# Patient Record
Sex: FEMALE | URBAN - METROPOLITAN AREA
[De-identification: names, ages, dates, MRNs, and addresses within clinical notes are randomized per-mention and may not be internally consistent; named-entity substitution may affect disease eponyms.]

---

## 2020-02-11 ENCOUNTER — TELEPHONE (OUTPATIENT)
Dept: NEUROLOGY | Facility: CLINIC | Age: 70
End: 2020-02-11

## 2021-03-22 ENCOUNTER — BULK ORDERING (OUTPATIENT)
Dept: CASE MANAGEMENT | Facility: OTHER | Age: 71
End: 2021-03-22

## 2021-03-22 DIAGNOSIS — Z23 IMMUNIZATION DUE: ICD-10-CM

## 2023-08-29 ENCOUNTER — TELEPHONE (OUTPATIENT)
Dept: NEUROLOGY | Facility: CLINIC | Age: 73
End: 2023-08-29
Payer: MEDICARE

## 2023-09-29 ENCOUNTER — OFFICE VISIT (OUTPATIENT)
Dept: NEUROLOGY | Facility: CLINIC | Age: 73
End: 2023-09-29
Payer: MEDICARE

## 2023-09-29 VITALS
HEIGHT: 62 IN | SYSTOLIC BLOOD PRESSURE: 110 MMHG | OXYGEN SATURATION: 96 % | WEIGHT: 194.2 LBS | DIASTOLIC BLOOD PRESSURE: 72 MMHG | HEART RATE: 90 BPM | BODY MASS INDEX: 35.74 KG/M2

## 2023-09-29 DIAGNOSIS — R41.89 COGNITIVE CHANGES: ICD-10-CM

## 2023-09-29 DIAGNOSIS — E53.9 VITAMIN B DEFICIENCY: Primary | ICD-10-CM

## 2023-09-29 RX ORDER — CYANOCOBALAMIN 1000 UG/ML
1000 INJECTION, SOLUTION INTRAMUSCULAR; SUBCUTANEOUS
Status: SHIPPED | OUTPATIENT
Start: 2023-09-29

## 2023-09-29 RX ORDER — METOPROLOL SUCCINATE 100 MG/1
200 TABLET, EXTENDED RELEASE ORAL DAILY
COMMUNITY
Start: 2023-09-15

## 2023-09-29 RX ORDER — ROSUVASTATIN CALCIUM 20 MG/1
20 TABLET, COATED ORAL DAILY
Qty: 30 TABLET | Refills: 11 | COMMUNITY
Start: 2023-08-04 | End: 2024-08-03

## 2023-09-29 RX ORDER — OMEPRAZOLE 20 MG/1
20 CAPSULE, DELAYED RELEASE ORAL DAILY
Qty: 30 CAPSULE | Refills: 11 | COMMUNITY
Start: 2023-08-10 | End: 2024-08-09

## 2023-09-29 RX ORDER — VENLAFAXINE 25 MG/1
1 TABLET ORAL 2 TIMES DAILY
COMMUNITY
Start: 2023-09-15

## 2023-09-29 RX ORDER — LOSARTAN POTASSIUM 25 MG/1
1 TABLET ORAL DAILY
COMMUNITY
Start: 2023-09-16

## 2023-09-29 RX ORDER — MELATONIN
1000 DAILY
COMMUNITY

## 2023-09-29 RX ORDER — BIOTIN 10000 MCG
CAPSULE ORAL
COMMUNITY
Start: 2019-03-01

## 2023-09-29 RX ORDER — CETIRIZINE HYDROCHLORIDE 5 MG/1
5 TABLET ORAL DAILY
COMMUNITY

## 2023-09-29 RX ORDER — AMLODIPINE BESYLATE 10 MG/1
10 TABLET ORAL DAILY
COMMUNITY
Start: 2023-09-15

## 2023-09-29 RX ADMIN — CYANOCOBALAMIN 1000 MCG: 1000 INJECTION, SOLUTION INTRAMUSCULAR; SUBCUTANEOUS at 11:16

## 2023-09-29 NOTE — PROGRESS NOTES
Chief complaint: History of short-term memory loss.    Patient ID: Laurie Kenyon is a 73 y.o. female.    HPI: I had the pleasure of seeing your patient today.  As you may know she is a 73-year-old female here for the evaluation of memory change.  Patient states that she has noted memory changes for the past few years.  She dates back to at least 2019.  She says that back then she started to notice some word finding trouble.  This has progressed to include difficulty reading.  She also has trouble with writing.  Spelling is an issue for her as of late.  She has not had any significant issues with the process of food preparation.  No trouble driving or navigating.  She does note that on 1 occasion she paid a bill twice.  She does seem to be good with her processes at work.  He has not left the water running or so on.  Again she navigates well when driving.  She has not gotten lost.  She does feel that overall these symptoms are progressive in nature.  No history of severe head trauma.  She does acknowledge a significant amount of social stressors in her life currently.  No double vision or loss of vision.  No slurred speech.  No focal weakness or numbness of her arms or legs.  Of note, she was noted to have a vitamin B12 level of 390 a year ago.  About a month ago it was 447.  She is not currently replacing her vitamin B12 orally or by injection.  No family history of dementia.    The following portions of the patient's history were reviewed and updated as appropriate: allergies, current medications, past family history, past medical history, past social history, past surgical history and problem list.    Review of Systems   Constitutional:  Positive for fatigue. Negative for unexpected weight change.   HENT:  Negative for ear pain, hearing loss, nosebleeds and tinnitus.    Eyes:  Negative for photophobia, pain and visual disturbance.   Respiratory:  Positive for cough and shortness of breath. Negative for chest  tightness and wheezing.    Cardiovascular:  Negative for chest pain and palpitations.   Musculoskeletal:  Positive for gait problem (balance, 1 fall).   Allergic/Immunologic: Negative for food allergies.   Neurological:  Positive for speech difficulty (trouble getting words out).   Psychiatric/Behavioral:  Negative for confusion, decreased concentration and sleep disturbance.     I have reviewed the review of systems above performed by my medical assistant.      Vitals:    09/29/23 0954   BP: 110/72   Pulse: 90   SpO2: 96%       Neurologic Exam     Mental Status   Oriented to person, place, and time.   Registration: recalls 3 of 3 objects. Follows 3 step commands.   Attention: normal. Concentration: normal.   Speech: speech is normal   Level of consciousness: alert  Knowledge: consistent with education (No deficits found.).   Normal comprehension.     Cranial Nerves     CN II   Visual fields full to confrontation.     CN III, IV, VI   Pupils are equal, round, and reactive to light.  Extraocular motions are normal.   CN III: no CN III palsy  CN VI: no CN VI palsy  Nystagmus: none   Diplopia: none    CN V   Facial sensation intact.     CN VII   Facial expression full, symmetric.     CN VIII   CN VIII normal.     CN IX, X   CN IX normal.   CN X normal.     CN XI   CN XI normal.     CN XII   CN XII normal.     Motor Exam   Muscle bulk: normal  Right arm tone: normal  Left arm tone: normal  Right leg tone: normal  Left leg tone: normal    Strength   Right neck flexion: 5/5  Left neck flexion: 5/5  Right neck extension: 5/5  Left neck extension: 5/5  Right deltoid: 5/5  Left deltoid: 5/5  Right biceps: 5/5  Left biceps: 5/5  Right triceps: 5/5  Left triceps: 5/5  Right wrist flexion: 5/5  Left wrist flexion: 5/5  Right wrist extension: 5/5  Left wrist extension: 5/5  Right interossei: 5/5  Left interossei: 5/5  Right abdominals: 5/5  Left abdominals: 5/5  Right iliopsoas: 5/5  Left iliopsoas: 5/5  Right quadriceps:  5/5  Left quadriceps: 5/5  Right hamstrin/5  Left hamstrin/5  Right glutei: 5/5  Left glutei: 5/5  Right anterior tibial: 5/5  Left anterior tibial: 5/5  Right posterior tibial: 5/5  Left posterior tibial: 5/5  Right peroneal: 5/5  Left peroneal: 5/5  Right gastroc: 5/5  Left gastroc: 5/5    Sensory Exam   Light touch normal.   Vibration normal.   Proprioception normal.   Pinprick normal.     Gait, Coordination, and Reflexes     Gait  Gait: normal    Coordination   Romberg: negative    Tremor   Resting tremor: absent  Intention tremor: absent    Reflexes   Right brachioradialis: 2+  Left brachioradialis: 2+  Right biceps: 2+  Left biceps: 2+  Right triceps: 2+  Left triceps: 2+  Right patellar: 2+  Left patellar: 2+  Right achilles: 2+  Left achilles: 2+  Right : 2+  Left : 2+Station is normal.     Physical Exam  Vitals reviewed.   Constitutional:       General: She is not in acute distress.     Appearance: She is well-developed.   HENT:      Head: Normocephalic and atraumatic.   Eyes:      Extraocular Movements: EOM normal.      Pupils: Pupils are equal, round, and reactive to light.   Cardiovascular:      Rate and Rhythm: Normal rate and regular rhythm.      Heart sounds: Normal heart sounds.   Pulmonary:      Effort: Pulmonary effort is normal. No respiratory distress.      Breath sounds: Normal breath sounds.   Abdominal:      General: Bowel sounds are normal. There is no distension.      Palpations: Abdomen is soft.      Tenderness: There is no abdominal tenderness.   Musculoskeletal:         General: No deformity.      Cervical back: Normal range of motion.   Skin:     General: Skin is warm.      Findings: No rash.   Neurological:      Mental Status: She is oriented to person, place, and time.      Coordination: Romberg Test normal.      Gait: Gait is intact.      Deep Tendon Reflexes:      Reflex Scores:       Tricep reflexes are 2+ on the right side and 2+ on the left side.       Bicep  reflexes are 2+ on the right side and 2+ on the left side.       Brachioradialis reflexes are 2+ on the right side and 2+ on the left side.       Patellar reflexes are 2+ on the right side and 2+ on the left side.       Achilles reflexes are 2+ on the right side and 2+ on the left side.  Psychiatric:         Speech: Speech normal.         Judgment: Judgment normal.       Procedures    Assessment/Plan: We are going to start her on a vitamin B12 injection protocol here at the office.  We are also going to schedule an MRI of the brain both with and without contrast.  We will order neuropsych testing for her.  She will see us back after testing is been completed.       Diagnoses and all orders for this visit:    1. Vitamin B deficiency (Primary)  -     cyanocobalamin injection 1,000 mcg    2. Cognitive changes  -     Ambulatory Referral to Neuropsychology  -     MRI Brain With & Without Contrast; Future           Josesito Joseph II, MD

## 2023-09-29 NOTE — LETTER
September 29, 2023       No Recipients    Patient: Laurie Kenyon   YOB: 1950   Date of Visit: 9/29/2023     Dear Mylene Bermudez MD:       Thank you for referring Laurie Kenyon to me for evaluation. Below are the relevant portions of my assessment and plan of care.    If you have questions, please do not hesitate to call me. I look forward to following Laurie along with you.         Sincerely,        Josesito Joseph II, MD        CC:   No Recipients    Josesito Joseph II, MD  09/29/23 1121  Sign when Signing Visit  Chief complaint: History of short-term memory loss.    Patient ID: Laurie Kenyon is a 73 y.o. female.    HPI: I had the pleasure of seeing your patient today.  As you may know she is a 73-year-old female here for the evaluation of memory change.  Patient states that she has noted memory changes for the past few years.  She dates back to at least 2019.  She says that back then she started to notice some word finding trouble.  This has progressed to include difficulty reading.  She also has trouble with writing.  Spelling is an issue for her as of late.  She has not had any significant issues with the process of food preparation.  No trouble driving or navigating.  She does note that on 1 occasion she paid a bill twice.  She does seem to be good with her processes at work.  He has not left the water running or so on.  Again she navigates well when driving.  She has not gotten lost.  She does feel that overall these symptoms are progressive in nature.  No history of severe head trauma.  She does acknowledge a significant amount of social stressors in her life currently.  No double vision or loss of vision.  No slurred speech.  No focal weakness or numbness of her arms or legs.  Of note, she was noted to have a vitamin B12 level of 390 a year ago.  About a month ago it was 447.  She is not currently replacing her vitamin B12 orally or by injection.  No family history of dementia.    The  following portions of the patient's history were reviewed and updated as appropriate: allergies, current medications, past family history, past medical history, past social history, past surgical history and problem list.    Review of Systems   Constitutional:  Positive for fatigue. Negative for unexpected weight change.   HENT:  Negative for ear pain, hearing loss, nosebleeds and tinnitus.    Eyes:  Negative for photophobia, pain and visual disturbance.   Respiratory:  Positive for cough and shortness of breath. Negative for chest tightness and wheezing.    Cardiovascular:  Negative for chest pain and palpitations.   Musculoskeletal:  Positive for gait problem (balance, 1 fall).   Allergic/Immunologic: Negative for food allergies.   Neurological:  Positive for speech difficulty (trouble getting words out).   Psychiatric/Behavioral:  Negative for confusion, decreased concentration and sleep disturbance.     I have reviewed the review of systems above performed by my medical assistant.      Vitals:    09/29/23 0954   BP: 110/72   Pulse: 90   SpO2: 96%       Neurologic Exam     Mental Status   Oriented to person, place, and time.   Registration: recalls 3 of 3 objects. Follows 3 step commands.   Attention: normal. Concentration: normal.   Speech: speech is normal   Level of consciousness: alert  Knowledge: consistent with education (No deficits found.).   Normal comprehension.     Cranial Nerves     CN II   Visual fields full to confrontation.     CN III, IV, VI   Pupils are equal, round, and reactive to light.  Extraocular motions are normal.   CN III: no CN III palsy  CN VI: no CN VI palsy  Nystagmus: none   Diplopia: none    CN V   Facial sensation intact.     CN VII   Facial expression full, symmetric.     CN VIII   CN VIII normal.     CN IX, X   CN IX normal.   CN X normal.     CN XI   CN XI normal.     CN XII   CN XII normal.     Motor Exam   Muscle bulk: normal  Right arm tone: normal  Left arm tone:  normal  Right leg tone: normal  Left leg tone: normal    Strength   Right neck flexion: 5/5  Left neck flexion: 5/5  Right neck extension: 5/5  Left neck extension: 5/5  Right deltoid: 5/5  Left deltoid: 5/5  Right biceps: 5/5  Left biceps: 5/5  Right triceps: 5/5  Left triceps: 5/5  Right wrist flexion: 5/5  Left wrist flexion: 5/5  Right wrist extension: 5/5  Left wrist extension: 5/5  Right interossei: 5/5  Left interossei: 5/5  Right abdominals: 5/5  Left abdominals: 5/5  Right iliopsoas: 5/5  Left iliopsoas: 5/5  Right quadriceps: 5/5  Left quadriceps: 5/5  Right hamstrin/5  Left hamstrin/5  Right glutei: 5/5  Left glutei: 5/5  Right anterior tibial: 5/5  Left anterior tibial: 5/5  Right posterior tibial: 5/5  Left posterior tibial: 5/5  Right peroneal: 5/5  Left peroneal: 5/5  Right gastroc: 5/5  Left gastroc: 5/5    Sensory Exam   Light touch normal.   Vibration normal.   Proprioception normal.   Pinprick normal.     Gait, Coordination, and Reflexes     Gait  Gait: normal    Coordination   Romberg: negative    Tremor   Resting tremor: absent  Intention tremor: absent    Reflexes   Right brachioradialis: 2+  Left brachioradialis: 2+  Right biceps: 2+  Left biceps: 2+  Right triceps: 2+  Left triceps: 2+  Right patellar: 2+  Left patellar: 2+  Right achilles: 2+  Left achilles: 2+  Right : 2+  Left : 2+Station is normal.     Physical Exam  Vitals reviewed.   Constitutional:       General: She is not in acute distress.     Appearance: She is well-developed.   HENT:      Head: Normocephalic and atraumatic.   Eyes:      Extraocular Movements: EOM normal.      Pupils: Pupils are equal, round, and reactive to light.   Cardiovascular:      Rate and Rhythm: Normal rate and regular rhythm.      Heart sounds: Normal heart sounds.   Pulmonary:      Effort: Pulmonary effort is normal. No respiratory distress.      Breath sounds: Normal breath sounds.   Abdominal:      General: Bowel sounds are normal.  There is no distension.      Palpations: Abdomen is soft.      Tenderness: There is no abdominal tenderness.   Musculoskeletal:         General: No deformity.      Cervical back: Normal range of motion.   Skin:     General: Skin is warm.      Findings: No rash.   Neurological:      Mental Status: She is oriented to person, place, and time.      Coordination: Romberg Test normal.      Gait: Gait is intact.      Deep Tendon Reflexes:      Reflex Scores:       Tricep reflexes are 2+ on the right side and 2+ on the left side.       Bicep reflexes are 2+ on the right side and 2+ on the left side.       Brachioradialis reflexes are 2+ on the right side and 2+ on the left side.       Patellar reflexes are 2+ on the right side and 2+ on the left side.       Achilles reflexes are 2+ on the right side and 2+ on the left side.  Psychiatric:         Speech: Speech normal.         Judgment: Judgment normal.       Procedures    Assessment/Plan: We are going to start her on a vitamin B12 injection protocol here at the office.  We are also going to schedule an MRI of the brain both with and without contrast.  We will order neuropsych testing for her.  She will see us back after testing is been completed.       Diagnoses and all orders for this visit:    1. Vitamin B deficiency (Primary)  -     cyanocobalamin injection 1,000 mcg    2. Cognitive changes  -     Ambulatory Referral to Neuropsychology  -     MRI Brain With & Without Contrast; Future           Josesito Joseph II, MD

## 2023-10-02 ENCOUNTER — PATIENT MESSAGE (OUTPATIENT)
Dept: NEUROLOGY | Facility: CLINIC | Age: 73
End: 2023-10-02
Payer: MEDICARE

## 2023-10-02 DIAGNOSIS — F41.8 SITUATIONAL ANXIETY: Primary | ICD-10-CM

## 2023-10-06 ENCOUNTER — PATIENT ROUNDING (BHMG ONLY) (OUTPATIENT)
Dept: NEUROLOGY | Facility: CLINIC | Age: 73
End: 2023-10-06
Payer: MEDICARE

## 2023-10-06 ENCOUNTER — CLINICAL SUPPORT (OUTPATIENT)
Dept: NEUROLOGY | Facility: CLINIC | Age: 73
End: 2023-10-06
Payer: COMMERCIAL

## 2023-10-06 DIAGNOSIS — E53.9 VITAMIN B DEFICIENCY: Primary | ICD-10-CM

## 2023-10-06 PROCEDURE — 96372 THER/PROPH/DIAG INJ SC/IM: CPT | Performed by: PSYCHIATRY & NEUROLOGY

## 2023-10-06 RX ORDER — AMOXICILLIN AND CLAVULANATE POTASSIUM 875; 125 MG/1; MG/1
1 TABLET, FILM COATED ORAL
COMMUNITY
Start: 2023-09-30 | End: 2023-10-10

## 2023-10-06 RX ORDER — CYANOCOBALAMIN 1000 UG/ML
1000 INJECTION, SOLUTION INTRAMUSCULAR; SUBCUTANEOUS
Status: SHIPPED | OUTPATIENT
Start: 2023-10-06

## 2023-10-06 RX ADMIN — CYANOCOBALAMIN 1000 MCG: 1000 INJECTION, SOLUTION INTRAMUSCULAR; SUBCUTANEOUS at 12:03

## 2023-10-13 ENCOUNTER — CLINICAL SUPPORT (OUTPATIENT)
Dept: NEUROLOGY | Facility: CLINIC | Age: 73
End: 2023-10-13
Payer: COMMERCIAL

## 2023-10-13 DIAGNOSIS — E53.9 VITAMIN B DEFICIENCY: Primary | ICD-10-CM

## 2023-10-13 PROCEDURE — 96372 THER/PROPH/DIAG INJ SC/IM: CPT | Performed by: PSYCHIATRY & NEUROLOGY

## 2023-10-13 RX ORDER — CYANOCOBALAMIN 1000 UG/ML
1000 INJECTION, SOLUTION INTRAMUSCULAR; SUBCUTANEOUS
Status: SHIPPED | OUTPATIENT
Start: 2023-10-13

## 2023-10-13 RX ADMIN — CYANOCOBALAMIN 1000 MCG: 1000 INJECTION, SOLUTION INTRAMUSCULAR; SUBCUTANEOUS at 11:37

## 2023-10-20 ENCOUNTER — CLINICAL SUPPORT (OUTPATIENT)
Dept: NEUROLOGY | Facility: CLINIC | Age: 73
End: 2023-10-20
Payer: MEDICARE

## 2023-10-20 DIAGNOSIS — E53.9 VITAMIN B DEFICIENCY: Primary | ICD-10-CM

## 2023-10-20 PROCEDURE — 96372 THER/PROPH/DIAG INJ SC/IM: CPT | Performed by: PSYCHIATRY & NEUROLOGY

## 2023-10-20 RX ORDER — CYANOCOBALAMIN 1000 UG/ML
1000 INJECTION, SOLUTION INTRAMUSCULAR; SUBCUTANEOUS
Status: SHIPPED | OUTPATIENT
Start: 2023-10-20

## 2023-10-20 RX ADMIN — CYANOCOBALAMIN 1000 MCG: 1000 INJECTION, SOLUTION INTRAMUSCULAR; SUBCUTANEOUS at 11:36

## 2023-10-23 RX ORDER — DIAZEPAM 10 MG/1
10 TABLET ORAL
Qty: 1 TABLET | Refills: 0 | Status: CANCELLED | OUTPATIENT
Start: 2023-10-23 | End: 2023-10-23

## 2023-10-24 RX ORDER — DIAZEPAM 10 MG/1
10 TABLET ORAL
Qty: 1 TABLET | Refills: 0 | Status: SHIPPED | OUTPATIENT
Start: 2023-10-24 | End: 2023-10-24

## 2023-11-03 ENCOUNTER — CLINICAL SUPPORT (OUTPATIENT)
Dept: NEUROLOGY | Facility: CLINIC | Age: 73
End: 2023-11-03
Payer: MEDICARE

## 2023-11-03 DIAGNOSIS — E53.9 VITAMIN B DEFICIENCY: Primary | ICD-10-CM

## 2023-11-03 PROCEDURE — 96372 THER/PROPH/DIAG INJ SC/IM: CPT | Performed by: PSYCHIATRY & NEUROLOGY

## 2023-11-03 RX ORDER — CYANOCOBALAMIN 1000 UG/ML
1000 INJECTION, SOLUTION INTRAMUSCULAR; SUBCUTANEOUS
Status: DISCONTINUED | OUTPATIENT
Start: 2023-11-03 | End: 2023-11-17

## 2023-11-03 RX ADMIN — CYANOCOBALAMIN 1000 MCG: 1000 INJECTION, SOLUTION INTRAMUSCULAR; SUBCUTANEOUS at 11:55

## 2023-11-17 ENCOUNTER — CLINICAL SUPPORT (OUTPATIENT)
Dept: NEUROLOGY | Facility: CLINIC | Age: 73
End: 2023-11-17
Payer: MEDICARE

## 2023-11-17 DIAGNOSIS — E53.9 VITAMIN B DEFICIENCY: Primary | ICD-10-CM

## 2023-11-17 PROCEDURE — 96372 THER/PROPH/DIAG INJ SC/IM: CPT | Performed by: PSYCHIATRY & NEUROLOGY

## 2023-11-17 RX ORDER — CYANOCOBALAMIN 1000 UG/ML
1000 INJECTION, SOLUTION INTRAMUSCULAR; SUBCUTANEOUS
Status: SHIPPED | OUTPATIENT
Start: 2023-11-17

## 2023-11-17 RX ADMIN — CYANOCOBALAMIN 1000 MCG: 1000 INJECTION, SOLUTION INTRAMUSCULAR; SUBCUTANEOUS at 11:59

## 2023-11-21 ENCOUNTER — HOSPITAL ENCOUNTER (OUTPATIENT)
Facility: HOSPITAL | Age: 73
Discharge: HOME | End: 2023-11-21
Payer: MEDICARE

## 2023-11-21 DIAGNOSIS — R41.89 COGNITIVE CHANGES: ICD-10-CM

## 2023-12-01 ENCOUNTER — CLINICAL SUPPORT (OUTPATIENT)
Dept: NEUROLOGY | Facility: CLINIC | Age: 73
End: 2023-12-01
Payer: MEDICARE

## 2023-12-01 DIAGNOSIS — E53.8 B12 DEFICIENCY: Primary | ICD-10-CM

## 2023-12-01 PROCEDURE — 96372 THER/PROPH/DIAG INJ SC/IM: CPT | Performed by: PSYCHIATRY & NEUROLOGY

## 2023-12-01 RX ORDER — CYANOCOBALAMIN 1000 UG/ML
1000 INJECTION, SOLUTION INTRAMUSCULAR; SUBCUTANEOUS
Status: SHIPPED | OUTPATIENT
Start: 2023-12-01

## 2023-12-01 RX ADMIN — CYANOCOBALAMIN 1000 MCG: 1000 INJECTION, SOLUTION INTRAMUSCULAR; SUBCUTANEOUS at 15:02

## 2023-12-11 DIAGNOSIS — R41.89 COGNITIVE CHANGES: Primary | ICD-10-CM

## 2023-12-15 ENCOUNTER — CLINICAL SUPPORT (OUTPATIENT)
Dept: NEUROLOGY | Facility: CLINIC | Age: 73
End: 2023-12-15
Payer: MEDICARE

## 2023-12-15 DIAGNOSIS — E53.8 B12 DEFICIENCY: Primary | ICD-10-CM

## 2023-12-15 PROCEDURE — 96372 THER/PROPH/DIAG INJ SC/IM: CPT | Performed by: PSYCHIATRY & NEUROLOGY

## 2023-12-15 RX ORDER — CYANOCOBALAMIN 1000 UG/ML
1000 INJECTION, SOLUTION INTRAMUSCULAR; SUBCUTANEOUS
Status: SHIPPED | OUTPATIENT
Start: 2023-12-15

## 2023-12-15 RX ADMIN — CYANOCOBALAMIN 1000 MCG: 1000 INJECTION, SOLUTION INTRAMUSCULAR; SUBCUTANEOUS at 11:42

## 2023-12-22 ENCOUNTER — TELEPHONE (OUTPATIENT)
Dept: NEUROLOGY | Facility: CLINIC | Age: 73
End: 2023-12-22
Payer: MEDICARE

## 2023-12-22 NOTE — TELEPHONE ENCOUNTER
Caller: Laurie Kenyon    Relationship: Self    Best call back number:   Telephone Information:   Mobile [phone]       What is the best time to reach you: ANYTIME    Who are you requesting to speak with (clinical staff, provider,  specific staff member): CLINICAL    What was the call regarding: ORDER FOR OPEN MRI - NEEDS AN ORDER SENT TO [facility] OPEN MRI ([phone])

## 2023-12-27 NOTE — TELEPHONE ENCOUNTER
Order has been resent. Patient is scheduled 01/25/24 arriving at 10:30 am and scan will be at 11:00 am. This will be completed at [facility]

## 2023-12-27 NOTE — TELEPHONE ENCOUNTER
Caller: Kostas Laurie    Relationship: Self    Best call back number: [phone]    Who are you requesting to speak with (clinical staff, provider,  specific staff member): STAFF    Do you know the name of the person who called: KULWANT NEAL    What was the call regarding: PATIENT CAN'T SCHEDULE WITH [facility] FOR MRI BECAUSE THEY ARE REPORTING THAT THEY HAVE NOT GOTTEN THE ORDER FROM DR. ATWOOD. CAN YOU PLEASE FAX THAT ORDER -[phone]?     Is it okay if the provider responds through MyChart: YES

## 2023-12-29 ENCOUNTER — CLINICAL SUPPORT (OUTPATIENT)
Dept: NEUROLOGY | Facility: CLINIC | Age: 73
End: 2023-12-29
Payer: MEDICARE

## 2023-12-29 DIAGNOSIS — E53.8 B12 DEFICIENCY: Primary | ICD-10-CM

## 2023-12-29 PROCEDURE — 96372 THER/PROPH/DIAG INJ SC/IM: CPT | Performed by: PSYCHIATRY & NEUROLOGY

## 2023-12-29 RX ORDER — CYANOCOBALAMIN 1000 UG/ML
1000 INJECTION, SOLUTION INTRAMUSCULAR; SUBCUTANEOUS
Status: SHIPPED | OUTPATIENT
Start: 2023-12-29

## 2023-12-29 RX ADMIN — CYANOCOBALAMIN 1000 MCG: 1000 INJECTION, SOLUTION INTRAMUSCULAR; SUBCUTANEOUS at 14:28

## 2024-01-12 ENCOUNTER — CLINICAL SUPPORT (OUTPATIENT)
Dept: NEUROLOGY | Facility: CLINIC | Age: 74
End: 2024-01-12
Payer: MEDICARE

## 2024-01-12 DIAGNOSIS — E53.8 B12 DEFICIENCY: Primary | ICD-10-CM

## 2024-01-12 PROCEDURE — 96372 THER/PROPH/DIAG INJ SC/IM: CPT | Performed by: PSYCHIATRY & NEUROLOGY

## 2024-01-12 RX ORDER — CYANOCOBALAMIN 1000 UG/ML
1000 INJECTION, SOLUTION INTRAMUSCULAR; SUBCUTANEOUS
Status: SHIPPED | OUTPATIENT
Start: 2024-01-12

## 2024-01-12 RX ADMIN — CYANOCOBALAMIN 1000 MCG: 1000 INJECTION, SOLUTION INTRAMUSCULAR; SUBCUTANEOUS at 12:34

## 2024-01-26 ENCOUNTER — CLINICAL SUPPORT (OUTPATIENT)
Dept: NEUROLOGY | Facility: CLINIC | Age: 74
End: 2024-01-26
Payer: MEDICARE

## 2024-01-26 DIAGNOSIS — E53.8 B12 DEFICIENCY: Primary | ICD-10-CM

## 2024-01-26 PROCEDURE — 96372 THER/PROPH/DIAG INJ SC/IM: CPT | Performed by: PSYCHIATRY & NEUROLOGY

## 2024-01-26 RX ORDER — CYANOCOBALAMIN 1000 UG/ML
1000 INJECTION, SOLUTION INTRAMUSCULAR; SUBCUTANEOUS
Status: SHIPPED | OUTPATIENT
Start: 2024-01-26

## 2024-01-26 RX ADMIN — CYANOCOBALAMIN 1000 MCG: 1000 INJECTION, SOLUTION INTRAMUSCULAR; SUBCUTANEOUS at 12:26

## 2024-02-09 ENCOUNTER — CLINICAL SUPPORT (OUTPATIENT)
Dept: NEUROLOGY | Facility: CLINIC | Age: 74
End: 2024-02-09
Payer: MEDICARE

## 2024-02-09 DIAGNOSIS — E53.9 VITAMIN B DEFICIENCY: Primary | ICD-10-CM

## 2024-02-09 PROCEDURE — 96372 THER/PROPH/DIAG INJ SC/IM: CPT | Performed by: PSYCHIATRY & NEUROLOGY

## 2024-02-09 RX ORDER — EZETIMIBE 10 MG/1
10 TABLET ORAL DAILY
COMMUNITY
Start: 2024-02-07 | End: 2025-02-06

## 2024-02-09 RX ORDER — CYANOCOBALAMIN 1000 UG/ML
1000 INJECTION, SOLUTION INTRAMUSCULAR; SUBCUTANEOUS
Status: SHIPPED | OUTPATIENT
Start: 2024-02-09

## 2024-02-09 RX ORDER — DIAZEPAM 10 MG/1
TABLET ORAL
COMMUNITY
Start: 2023-10-24

## 2024-02-09 RX ADMIN — CYANOCOBALAMIN 1000 MCG: 1000 INJECTION, SOLUTION INTRAMUSCULAR; SUBCUTANEOUS at 11:41

## 2024-03-15 ENCOUNTER — LAB (OUTPATIENT)
Dept: LAB | Facility: HOSPITAL | Age: 74
End: 2024-03-15
Payer: MEDICARE

## 2024-03-15 DIAGNOSIS — E53.9 VITAMIN B DEFICIENCY: ICD-10-CM

## 2024-03-15 DIAGNOSIS — E53.9 VITAMIN B DEFICIENCY: Primary | ICD-10-CM

## 2024-03-15 LAB — VIT B12 BLD-MCNC: 729 PG/ML (ref 211–946)

## 2024-03-15 PROCEDURE — 82607 VITAMIN B-12: CPT

## 2024-03-15 PROCEDURE — 36415 COLL VENOUS BLD VENIPUNCTURE: CPT

## 2024-04-05 ENCOUNTER — TELEPHONE (OUTPATIENT)
Dept: NEUROLOGY | Facility: CLINIC | Age: 74
End: 2024-04-05
Payer: MEDICARE

## 2024-04-05 NOTE — TELEPHONE ENCOUNTER
----- Message from NICOLASA Hicks sent at 3/19/2024  4:59 PM EDT -----  B12 great at 729. May take OTC B12 supplement 500-1000 mcg daily

## 2024-04-08 ENCOUNTER — OFFICE VISIT (OUTPATIENT)
Dept: NEUROLOGY | Facility: CLINIC | Age: 74
End: 2024-04-08
Payer: MEDICARE

## 2024-04-08 VITALS
HEART RATE: 74 BPM | BODY MASS INDEX: 35.33 KG/M2 | SYSTOLIC BLOOD PRESSURE: 118 MMHG | WEIGHT: 192 LBS | DIASTOLIC BLOOD PRESSURE: 72 MMHG | OXYGEN SATURATION: 93 % | HEIGHT: 62 IN

## 2024-04-08 DIAGNOSIS — R41.89 COGNITIVE CHANGES: ICD-10-CM

## 2024-04-08 DIAGNOSIS — E53.8 B12 DEFICIENCY: Primary | ICD-10-CM

## 2024-04-08 PROCEDURE — 1160F RVW MEDS BY RX/DR IN RCRD: CPT | Performed by: PSYCHIATRY & NEUROLOGY

## 2024-04-08 PROCEDURE — 1159F MED LIST DOCD IN RCRD: CPT | Performed by: PSYCHIATRY & NEUROLOGY

## 2024-04-08 PROCEDURE — 99214 OFFICE O/P EST MOD 30 MIN: CPT | Performed by: PSYCHIATRY & NEUROLOGY

## 2024-04-08 NOTE — PROGRESS NOTES
Chief Complaint   Patient presents with    Cognitive changes     Vitamin B deficiency       Patient ID: Laurie Kenyon is a 73 y.o. female.    HPI: I had the pleasure of seeing your patient again today.  As you may know she is a 73-year-old female here for the management of vitamin B12 deficiency and cognitive changes.  She did have neuropsych testing which showed evidence for reaction to significant emotional stress.  They do not feel that she has a neurodegenerative disorder such as mild cognitive impairment or dementia.  She does acknowledge a history significant for anxiety and stress.  She does not feel that she has gotten worse.  She still is somewhat forgetful.  She has taken upon herself to schedule an appointment with a therapist.  She also acknowledges some issues with sleep which may be contributing to her cognitive issues.  She still does have some trouble with speech.  She has some difficulty getting words out.  However upon the neuropsych testing they did not note significant aphasia or diagnosis of primary progressive aphasia.    The following portions of the patient's history were reviewed and updated as appropriate: allergies, current medications, past family history, past medical history, past social history, past surgical history and problem list.    Review of Systems   Constitutional:  Positive for fatigue.   Neurological:  Positive for speech difficulty and headaches. Negative for dizziness, tremors, seizures, syncope, facial asymmetry, weakness, light-headedness and numbness.   Psychiatric/Behavioral:  Positive for sleep disturbance. Negative for agitation, behavioral problems, confusion, decreased concentration, dysphoric mood, hallucinations, self-injury and suicidal ideas. The patient is not nervous/anxious and is not hyperactive.       I have reviewed the review of systems above performed by my medical assistant.      Vitals:    04/08/24 1130   BP: 118/72   Pulse: 74   SpO2: 93%        Neurologic Exam     Mental Status   Oriented to person, place, and time.   Concentration: normal.   Level of consciousness: alert  Knowledge: consistent with education (No deficits found.).     Cranial Nerves     CN II   Visual fields full to confrontation.     CN III, IV, VI   Pupils are equal, round, and reactive to light.  Extraocular motions are normal.   CN III: no CN III palsy  CN VI: no CN VI palsy    CN V   Facial sensation intact.     CN VII   Facial expression full, symmetric.     CN VIII   CN VIII normal.     CN IX, X   CN IX normal.   CN X normal.     CN XI   CN XI normal.     CN XII   CN XII normal.     Motor Exam     Strength   Right neck flexion: 5/5  Left neck flexion: 5/5  Right neck extension: 5/5  Left neck extension: 5/5  Right deltoid: 5/5  Left deltoid: 5/5  Right biceps: 5/5  Left biceps: 5/5  Right triceps: 5/5  Left triceps: 5/5  Right wrist flexion: 5/5  Left wrist flexion: 5/5  Right wrist extension: 5/5  Left wrist extension: 5/5  Right interossei: 5/5  Left interossei: 5/5  Right abdominals: 5/5  Left abdominals: 5/5  Right iliopsoas: 5/5  Left iliopsoas: 5/5  Right quadriceps: 5/5  Left quadriceps: 5/5  Right hamstrin/5  Left hamstrin/5  Right glutei: 5/5  Left glutei: 5/5  Right anterior tibial: 5/5  Left anterior tibial: 5/5  Right posterior tibial: 5/5  Left posterior tibial: 5/5  Right peroneal: 5/5  Left peroneal: 5/5  Right gastroc: 5/5  Left gastroc: 5/5    Sensory Exam   Light touch normal.   Vibration normal.     Gait, Coordination, and Reflexes     Gait  Gait: normal    Reflexes   Right brachioradialis: 2+  Left brachioradialis: 2+  Right biceps: 2+  Left biceps: 2+  Right triceps: 2+  Left triceps: 2+  Right patellar: 2+  Left patellar: 2+  Right achilles: 2+  Left achilles: 2+  Right : 2+  Left : 2+Station is normal.       Physical Exam  Vitals reviewed.   Constitutional:       Appearance: She is well-developed.   HENT:      Head: Normocephalic and  atraumatic.   Eyes:      Extraocular Movements: EOM normal.      Pupils: Pupils are equal, round, and reactive to light.   Cardiovascular:      Rate and Rhythm: Normal rate and regular rhythm.   Pulmonary:      Breath sounds: Normal breath sounds.   Musculoskeletal:         General: Normal range of motion.   Skin:     General: Skin is warm.   Neurological:      Mental Status: She is oriented to person, place, and time.      Gait: Gait is intact.      Deep Tendon Reflexes:      Reflex Scores:       Tricep reflexes are 2+ on the right side and 2+ on the left side.       Bicep reflexes are 2+ on the right side and 2+ on the left side.       Brachioradialis reflexes are 2+ on the right side and 2+ on the left side.       Patellar reflexes are 2+ on the right side and 2+ on the left side.       Achilles reflexes are 2+ on the right side and 2+ on the left side.        Procedures    Assessment/Plan: I would like to schedule her for speech therapy.  We are also going to check a vitamin B12 level.  She has completed a vitamin B12 injection protocol.  Will see her back in 6 months or sooner if needed to review symptoms.  A total of 30 minutes was spent face-to-face with the patient today.  Of that greater than 50% of this time was spent discussing signs and symptoms of B12 deficiency, cognitive change, patient education, plan of care and prognosis.         Diagnoses and all orders for this visit:    1. B12 deficiency (Primary)  -     Vitamin B12    2. Cognitive changes  -     Ambulatory Referral to Speech Therapy           Josesito Joseph II, MD

## 2024-04-23 ENCOUNTER — LAB (OUTPATIENT)
Dept: LAB | Facility: HOSPITAL | Age: 74
End: 2024-04-23
Payer: MEDICARE

## 2024-04-23 LAB — VIT B12 BLD-MCNC: 767 PG/ML (ref 211–946)

## 2024-04-23 PROCEDURE — 82607 VITAMIN B-12: CPT | Performed by: PSYCHIATRY & NEUROLOGY

## 2024-04-26 ENCOUNTER — TELEPHONE (OUTPATIENT)
Dept: NEUROLOGY | Facility: CLINIC | Age: 74
End: 2024-04-26
Payer: MEDICARE

## 2024-04-26 NOTE — TELEPHONE ENCOUNTER
----- Message from Alana ACE sent at 4/25/2024  1:21 PM EDT -----  B12 767, normal. May take OTC B12 supplement 500-1000 mcg daily

## 2024-05-31 ENCOUNTER — HOSPITAL ENCOUNTER (OUTPATIENT)
Dept: SPEECH THERAPY | Facility: HOSPITAL | Age: 74
Setting detail: THERAPIES SERIES
Discharge: HOME | End: 2024-05-31
Payer: MEDICARE

## 2024-05-31 DIAGNOSIS — R41.89 COGNITIVE CHANGES: Primary | ICD-10-CM

## 2024-05-31 PROCEDURE — 96125 COGNITIVE TEST BY HC PRO: CPT | Performed by: SPEECH-LANGUAGE PATHOLOGIST

## 2024-05-31 NOTE — THERAPY EVALUATION
"Outpatient Speech Language Pathology   Adult Speech Language Cognitive Initial Evaluation  [facility]     Patient Name: Laurie Kenyon  : 1950  MRN: [id number]  Today's Date: 2024        Visit Date: 2024   There is no problem list on file for this patient.       Past Medical History:   Diagnosis Date    GERD (gastroesophageal reflux disease) 2019    Hyperlipidemia     Hypertension         Past Surgical History:   Procedure Laterality Date    BREAST RECONSTRUCTION Bilateral     CHOLECYSTECTOMY  2001    MASTECTOMY Bilateral     MYOMECTOMY Bilateral 1998    TONSILLECTOMY           Visit Dx:    ICD-10-CM ICD-9-CM   1. Cognitive changes  R41.89 799.59            OP SLP Assessment/Plan - 24 1559          SLP Assessment    Functional Problems Speech Language- Adult/Cognition  -KA    Clinical Impression: Speech Language-Adult/Congnition Speech Language WFL;Cognitive Communication WFL  -KA    Please refer to paper survey for additional self-reported information Yes  -KA    Please refer to items scanned into chart for additional diagnostic informaiton and handouts as provided by clinician Yes  -KA              User Key  (r) = Recorded By, (t) = Taken By, (c) = Cosigned By      Initials Name Provider Type    Dmitriy Simons, SLP Speech and Language Pathologist                     SLP SLC Evaluation - 24 0900          Communication Assessment/Intervention    Document Type evaluation  -KA    Subjective Information no complaints  -KA    Patient Observations alert;cooperative  -KA    Patient Effort good  -KA    Symptoms Noted During/After Treatment none  -KA       General Information    Patient Profile Reviewed yes  -KA    Pertinent History Of Current Problem Patient referred for cognitive changes. Per MD note \"She did have neuropsych testing which showed evidence for reaction to significant emotional stress.  They do not feel that she has a neurodegenerative disorder such as mild cognitive " "impairment or dementia.  She does acknowledge a history significant for anxiety and stress.  She does not feel that she has gotten worse.  She still is somewhat forgetful.  She has taken upon herself to schedule an appointment with a therapist.  She also acknowledges some issues with sleep which may be contributing to her cognitive issues.  She still does have some trouble with speech.  She has some difficulty getting words out.  However upon the neuropsych testing they did not note significant aphasia or diagnosis of primary progressive aphasia.\" Patient reports today no cognitive changes, denies memory decline. Patient c/o speech issues, \"broken speech.\" Patient denies word finding difficulty and denies slurred speech. Patient reports speech improves without stress and with improved sleep. Patient reports to being under alot of stress due to her daughters health conditions and raising a daughter later in life without her .  -KA    Precautions/Limitations, Vision WFL with corrective lenses  -KA    Precautions/Limitations, Hearing WFL  -    Prior Level of Function-Communication WFL  -    Plans/Goals Discussed with patient  -    Barriers to Rehab none identified  -    Patient's Goals for Discharge functional communication  -    Standardized Assessment Used CLQT  -       Standardized Tests    Cognitive/Memory Tests CLQT: Cognitive Linguistic Quick Test  -       CLQT (The Cognitive Linguistic Quick Test)    Attention Domain Score 200  -KA    Attention Severity Rating 4: WNL  -KA    Memory Domain Score 165  -KA    Memory Severity Rating 4: WNL  -KA    Executive Function Domain Score 25  -KA    Executive Function Severity Rating 4: WNL  -KA    Language Domain Score 32  -KA    Language Severity Rating 4: WN  -KA    Visuospatial Domain Score 90  -KA    Visuospatial Severity Rating 4: [facility]  -    Clock Drawing Total Score 13  -KA    Clock Drawing Severity Rating WNL  -    Composite Severity Rating " "4.0  -KA    Composite Severity Rating Range 4.0 - 3.5: WNL  -KA    CLQT Comments Overall pt demonstrates functional communication and cognitive skills. Only subtest pt scored below the criterion cut off score was in design generation. Patient demonstrates functional memory for detailed paragraph recall and functional thought organization in generating naming task. At the conversation level pt effectively communicated her background and medical information with no word finding impairment and no barriers communicating her thoughts and ideas. Pt demonstrated no evidence of slurred speech.  Pt very occasionally hesitated when formulating longer word with increased syllables (\"appreciation\") however was able to formulate the word with no cues. Overall pt demonstrated functional communication skills. Patient acknowledges she is hard on herself and her symptoms are improved when able to manage stress and improve sleep.  -KA       SLP Evaluation Clinical Impressions    SLP Diagnosis functional speech/language skills;functional cognitive-linguistic skills  -KA    [facility] Criteria for Skilled Therapy Interventions Met no problems identified which require skilled intervention  -KA    Functional Impact no impact on function  -KA              User Key  (r) = Recorded By, (t) = Taken By, (c) = Cosigned By      Initials Name Provider Type    Dmitriy Simons, SLP Speech and Language Pathologist                                    OP SLP Education       Row Name 05/31/24 5419       Education    Barriers to Learning No barriers identified  -KA    Education Provided Described results of evaluation;Patient expressed understanding of evaluation  -KA    Assessed Learning needs;Learning motivation  -KA    Learning Motivation Strong  -SUSAN    Learning Method Explanation  -SUSAN    Teaching Response Verbalized understanding  -SUSAN              User Key  (r) = Recorded By, (t) = Taken By, (c) = Cosigned By      Initials Name Effective Dates    KA " Dmitriy Sorenson SLP 24 -                              Time Calculation:   SLP Start Time: 1300  SLP Stop Time: 1355  SLP Time Calculation (min): 55 min  Timed Charges  96125-Standardized cognitive performance testin  Total Minutes  Timed Charges Total Minutes: 60   Total Minutes: 60    Therapy Charges for Today       Code Description Service Date Service Provider Modifiers Qty    99678465864 HC ST STD COG PERF TEST PER HOUR 2024 Dmitriy Sorenson, GILBERT GN 1                     GILBERT Cardenas  2024

## 2024-10-10 ENCOUNTER — OFFICE VISIT (OUTPATIENT)
Dept: NEUROLOGY | Facility: CLINIC | Age: 74
End: 2024-10-10
Payer: MEDICARE

## 2024-10-10 VITALS
WEIGHT: 179 LBS | DIASTOLIC BLOOD PRESSURE: 68 MMHG | HEIGHT: 62 IN | OXYGEN SATURATION: 95 % | BODY MASS INDEX: 32.94 KG/M2 | RESPIRATION RATE: 20 BRPM | SYSTOLIC BLOOD PRESSURE: 124 MMHG | HEART RATE: 72 BPM

## 2024-10-10 DIAGNOSIS — E53.8 B12 DEFICIENCY: ICD-10-CM

## 2024-10-10 DIAGNOSIS — J38.3 VOCAL CORD DYSFUNCTION: Primary | ICD-10-CM

## 2024-10-10 PROBLEM — C50.919 MALIGNANT TUMOR OF BREAST: Status: ACTIVE | Noted: 2022-10-11

## 2024-10-10 PROBLEM — Z90.13 HISTORY OF BILATERAL MASTECTOMY: Status: ACTIVE | Noted: 2023-08-10

## 2024-10-10 PROBLEM — H25.13 AGE-RELATED NUCLEAR CATARACT OF BOTH EYES: Status: ACTIVE | Noted: 2021-09-19

## 2024-10-10 PROBLEM — E78.00 HYPERCHOLESTEROLEMIA: Status: ACTIVE | Noted: 2018-09-24

## 2024-10-10 PROBLEM — K31.1 PYLORIC STENOSIS: Status: ACTIVE | Noted: 2023-04-10

## 2024-10-10 RX ORDER — VENLAFAXINE HCL 150 MG
CAPSULE, EXT RELEASE 24 HR ORAL
COMMUNITY
Start: 2024-06-05

## 2024-10-10 NOTE — PROGRESS NOTES
DOS: 10/10/2024  NAME: Laurie Kenyon   : 1950  PCP: Mylene Bermudez MD    Chief Complaint   Patient presents with    cognitive changes      SUBJECTIVE  Neurological Problem:  74 y.o.  female with a past medical history of HTN, hypercholesterolemia, h/o breast ca s/p bilat mastectomy, vit B12 deficiency. They are seen in follow up today for B12 deficiency and memory change, however the problem is new to the examiner. Patient last seen by Dr. Josesito Joseph in 2024, with a summary of the history taken from the previous note with additions/modifications as indicated. She is unaccompanied.    Interval History:   Mrs. Kenyon initially presented to our clinic in 2023 for the evaluation of memory change, previously seen by Dr. Joseph.  She denied any personal history of severe head trauma, denied any significant issues with executive functioning, did acknowledge a significant amount of social stressors in her life.  She was noted to have a low vitamin B12 level of 390 and received B12 replacement which brought her level up above 500.  Dr. Joseph referred her for an MRI brain w/wo and an evaluation by neuropsych.  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  Her neuropsych testing showed evidence for reaction to significant emotional stress, no etiology of a neurodegenerative disorder noted nor any note of a significant aphasia or diagnosis of primary progressive aphasia.    She presents today in follow-up and reports that she has been attending behavioral therapy for the past 6 months.  She also plans to be retested by neuropsych again this December which is approximately 1 year from her initial testing.  She has been practicing the recommendations such as regular physical activity and her prescribed antidepressant dose was increased which she feels is helpful.  She also was evaluated by speech therapy/cognitive therapy  "and says she was told her issues are not cognitive related.  She says she continues to feel there are issues with her speech, says that she feels she is halting in her delivery and that her speech is not \"smooth\".  She says her PCP noticed the speech differences this time and out of concern ordered a repeat brain MRI and an evaluation of her carotid arteries to ensure there is no disruption in blood flow.  She denies any facial droop, slurred speech or trouble swallowing.  She denies any swelling, no swollen lymph nodes or growths on her neck, jaw or face or in her throat.  Today she also request a repeat vitamin B12 level, last result in April was 767.    Review of Systems   HENT:  Negative for congestion, drooling, facial swelling, trouble swallowing and voice change.    Respiratory:  Negative for choking and shortness of breath.    Musculoskeletal:  Negative for gait problem.   Neurological:  Positive for speech difficulty. Negative for dizziness, tremors, seizures, syncope, facial asymmetry, weakness, light-headedness, numbness and headaches.   Psychiatric/Behavioral:  Negative for agitation, behavioral problems, confusion, decreased concentration, dysphoric mood, hallucinations, self-injury, sleep disturbance and suicidal ideas. The patient is not nervous/anxious and is not hyperactive.         The following portions of the patient's history were reviewed and updated as appropriate: allergies, current medications, past family history, past medical history, past social history, past surgical history and problem list.    Current Medications:   Current Outpatient Medications:     amLODIPine (NORVASC) 10 MG tablet, Take 1 tablet by mouth Daily., Disp: , Rfl:     Calcium-Vitamin D-Vitamin K 500-100-40 MG-UNT-MCG chewable tablet, Chew., Disp: , Rfl:     cetirizine (zyrTEC) 5 MG tablet, Take 1 tablet by mouth Daily., Disp: , Rfl:     cholecalciferol (VITAMIN D3) 25 MCG (1000 UT) tablet, Take 1 tablet by mouth Daily., " "Disp: , Rfl:     cyanocobalamin (VITAMIN B-12) 2000 MCG tablet, Take  by mouth., Disp: , Rfl:     Effexor  MG 24 hr capsule, , Disp: , Rfl:     ezetimibe (ZETIA) 10 MG tablet, Take 1 tablet by mouth Daily., Disp: , Rfl:     losartan (COZAAR) 25 MG tablet, Take 1 tablet by mouth Daily., Disp: , Rfl:     metoprolol succinate XL (TOPROL-XL) 100 MG 24 hr tablet, Take 2 tablets by mouth Daily., Disp: , Rfl:     MULTIPLE VITAMINS-MINERALS ER PO, Take  by mouth., Disp: , Rfl:     rosuvastatin (CRESTOR) 20 MG tablet, Take 1 tablet by mouth Daily., Disp: 30 tablet, Rfl: 11    Biotin 10 MG capsule, , Disp: , Rfl:     diazePAM (VALIUM) 10 MG tablet, TAKE 1 TABLET BY MOUTH EVERY DAY AS ONE DOSE, Disp: , Rfl:     Current Facility-Administered Medications:     cyanocobalamin injection 1,000 mcg, 1,000 mcg, Intramuscular, Q28 Days, Josesito Joseph II, MD, 1,000 mcg at 11/17/23 1159    cyanocobalamin injection 1,000 mcg, 1,000 mcg, Intramuscular, Q28 Days, Josesito Joseph II, MD, 1,000 mcg at 12/29/23 1428    cyanocobalamin injection 1,000 mcg, 1,000 mcg, Intramuscular, Q28 Days, Josseito Joseph II, MD, 1,000 mcg at 12/15/23 1142    cyanocobalamin injection 1,000 mcg, 1,000 mcg, Intramuscular, Q28 Days, Josesito Joseph II, MD    cyanocobalamin injection 1,000 mcg, 1,000 mcg, Intramuscular, Q28 Days, Josesito Joseph II, MD, 1,000 mcg at 01/12/24 1234    cyanocobalamin injection 1,000 mcg, 1,000 mcg, Intramuscular, Q28 Days, Josesito Joseph II, MD, 1,000 mcg at 01/26/24 1226    cyanocobalamin injection 1,000 mcg, 1,000 mcg, Intramuscular, Q28 Days, Josesito Joseph II, MD, 1,000 mcg at 02/09/24 1141  **I did not stop or change the above medications.  Patient's medication list was updated to reflect medications they have reported as currently taking, including medication changes made by other providers.    Objective   Vital Signs:  /68   Pulse 72   Resp 20   Ht 157.5 cm (62.01\")   Wt 81.2 kg (179 lb)   SpO2 95%   BMI " "32.73 kg/m²   Body mass index is 32.73 kg/m².    Physical Exam  HENT:      Head:      Salivary Glands: Right salivary gland is not diffusely enlarged or tender. Left salivary gland is not diffusely enlarged or tender.      Mouth/Throat:      Lips: No lesions.      Mouth: No injury or angioedema.      Tongue: No lesions. Tongue does not deviate from midline.      Palate: No mass and lesions.      Pharynx: Oropharynx is clear. Uvula midline.   Neck:      Thyroid: No thyroid mass or thyromegaly.      Trachea: Trachea and phonation normal.   Musculoskeletal:      Cervical back: Full passive range of motion without pain and neck supple.   Lymphadenopathy:      Cervical: No cervical adenopathy.        Physical Exam:  GENERAL: NAD, alert  HEENT: Normocephalic, atraumatic   Resp: Even and unlabored  Extremities: No edema  Skin: Warm and dry  Psychiatric: Normal mood and affect    Neurological:   MS: AOx3, recent/remote memory intact, normal attention/concentration, language intact, no neglect.   CN: visual acuity grossly normal, PERRL, EOMI, no nystagmus, no facial droop, no dysarthria  Motor: Moves all four extremities symmetrically and against gravity. Normal tone and bulk. No tremor or abnormal movements noted.   Sensory: Intact to crude touch in all four ext.  Gait and station: Normal gait and station    Result Review :  The following data was reviewed by: NICOLASA Hicks on 10/10/2024:  Laboratory Results:         Lab Results   Component Value Date    WBC 8.67 08/10/2023    HGB 14.7 08/10/2023    HCT 46.1 (H) 08/10/2023    MCV 93.7 08/10/2023     08/10/2023     Lab Results   Component Value Date    BUN 15 02/08/2023    CREATININE 0.81 02/08/2023    EGFRIFAFRI >60 02/08/2023    BCR 18.3 02/08/2023    K 4.9 02/08/2023    CO2 28 02/08/2023    CALCIUM 9.1 02/08/2023    ALBUMIN 4.2 02/08/2023    LABIL2 1.9 02/08/2023    AST 43 (H) 02/08/2023    ALT 67 (H) 02/08/2023     No results found for: \"HGBA1C\"  No " "results found for: \"CHOL\"  Lab Results   Component Value Date    HDL 56 11/20/2019    HDL 55 05/15/2019    HDL 52 11/14/2018     Lab Results   Component Value Date     (H) 11/20/2019     (H) 05/15/2019     (H) 11/14/2018     Lab Results   Component Value Date    TRIG 104 11/20/2019    TRIG 172 (H) 05/15/2019    TRIG 148 11/14/2018     No results found for: \"RPR\"  Lab Results   Component Value Date    TSH 1.250 02/09/2022     Lab Results   Component Value Date    MBBCGLWD54 767 04/23/2024       Data reviewed : Radiologic studies   and Consultant notes           Assessment and Plan   Diagnoses and all orders for this visit:    1. Vocal cord dysfunction (Primary)  -     Ambulatory Referral to ENT (Otolaryngology)    2. B12 deficiency  -     Vitamin B12      Laurie has had a fairly thorough workup with nothing pointing to a direct central cause.  I question if there could be some type of vocal cord dysfunction I would like for her to see an ENT for further evaluation.  I have asked her to have the results of her upcoming neuropsych evaluation faxed to our office.  We will also recheck a vitamin B12 level at her request today.    We will see Laurie back in 8 months, sooner if symptoms warrant.     NICOLASA Hicks  [facility]   10/10/24       I spent 30 minutes caring for Laurie on this date of service. This time includes time spent by me in the following activities:preparing for the visit, reviewing tests, obtaining and/or reviewing a separately obtained history, performing a medically appropriate examination and/or evaluation , counseling and educating the patient/family/caregiver, ordering medications, tests, or procedures, referring and communicating with other health care professionals , documenting information in the medical record, independently interpreting results and communicating that information with the patient/family/caregiver, and care coordination  Follow Up   No follow-ups on " file.  Patient was given instructions and counseling regarding her condition or for health maintenance advice. Please see specific information pulled into the AVS if appropriate.       Dictated using Dragon Dictation

## 2024-10-11 LAB — VIT B12 SERPL-MCNC: 1505 PG/ML (ref 211–946)

## 2025-05-29 ENCOUNTER — TELEPHONE (OUTPATIENT)
Dept: NEUROLOGY | Facility: CLINIC | Age: 75
End: 2025-05-29
Payer: MEDICARE

## 2025-05-29 NOTE — TELEPHONE ENCOUNTER
States she is not having any issues and doesn't see a reason to come in next week. We will cancel her appt. She will come in for her Sept appt with Dr. Joseph. She will reach out to her ENT (Saroj Nguyen) for records to send to us